# Patient Record
Sex: MALE
[De-identification: names, ages, dates, MRNs, and addresses within clinical notes are randomized per-mention and may not be internally consistent; named-entity substitution may affect disease eponyms.]

---

## 2018-12-27 ENCOUNTER — HOSPITAL ENCOUNTER (EMERGENCY)
Dept: HOSPITAL 97 - ER | Age: 28
Discharge: HOME | End: 2018-12-27
Payer: SELF-PAY

## 2018-12-27 DIAGNOSIS — M54.5: Primary | ICD-10-CM

## 2018-12-27 PROCEDURE — 96372 THER/PROPH/DIAG INJ SC/IM: CPT

## 2018-12-27 PROCEDURE — 99283 EMERGENCY DEPT VISIT LOW MDM: CPT

## 2018-12-27 NOTE — EDPHYS
Physician Documentation                                                                           

 [facility]                                                                

Name: Gagandeep Rehman Jr                                                                           

Age: 28 yrs                                                                                       

Sex: Male                                                                                         

: 1990                                                                                   

MRN: [id number]                                                                                   

Arrival Date: 2018                                                                          

Time: 20:21                                                                                       

Account#: [id number]                                                                            

Bed 16                                                                                            

Private MD: None, None                                                                            

ED Physician Vinayak Cook                                                                     

HPI:                                                                                              

                                                                                             

20:52 This 28 yrs old  Male presents to ER via Ambulatory with complaints of Low Back kb  

      Pain.                                                                                       

20:52 The patient presents with pain that is acute, with no known mechanism of injury, and    kb  

      tenderness. The symptoms are located in the right low back. The pain does not radiate.      

      The problem was sustained without known cause. Onset: The symptoms/episode                  

      began/occurred 17 day(s) ago. Modifying factors: the patient symptoms are aggravated by     

      movement, standing. Associated signs and symptoms: The patient has no apparent              

      associated signs or symptoms. Severity of symptoms: At their worst the symptoms were        

      moderate, in the emergency department the symptoms are unchanged. The patient has not       

      experienced similar symptoms in the past. The patient has not recently seen a               

      physician. Pt reports right low back pain that started on . Denies injury      

      or trauma, woke up with the pain. .                                                         

                                                                                                  

Historical:                                                                                       

- Allergies:                                                                                      

20:27 No Known Allergies;                                                                     aj1 

- Home Meds:                                                                                      

20:27 None [Active];                                                                          aj1 

- PMHx:                                                                                           

20:27 None;                                                                                   aj1 

- PSHx:                                                                                           

20:27 None;                                                                                   aj1 

                                                                                                  

- Immunization history:: Flu vaccine is not up to date.                                           

- Social history:: Smoking status: Patient/guardian denies using tobacco.                         

- Ebola Screening: : Patient denies travel to an Ebola-affected area in the 21 days               

  before illness onset.                                                                           

                                                                                                  

                                                                                                  

ROS:                                                                                              

20:51 Constitutional: Negative for fever, chills, and weight loss, ENT: Negative for injury,  kb  

      pain, and discharge, Neck: Negative for injury, pain, and swelling, Cardiovascular:         

      Negative for chest pain, palpitations, and edema, Respiratory: Negative for shortness       

      of breath, cough, wheezing, and pleuritic chest pain, Abdomen/GI: Negative for              

      abdominal pain, nausea, vomiting, diarrhea, and constipation, MS/Extremity: Negative        

      for injury and deformity, Skin: Negative for injury, rash, and discoloration, Neuro:        

      Negative for headache, weakness, numbness, tingling, and seizure.                           

20:51 Back: Positive for pain at rest, pain with movement, of the right low back.                 

                                                                                                  

Exam:                                                                                             

20:51 Constitutional:  This is a well developed, well nourished patient who is awake, alert,  kb  

      and in no acute distress. Head/Face:  Normocephalic, atraumatic. ENT:  Nares patent. No     

      nasal discharge, no septal abnormalities noted.  Tympanic membranes are normal and          

      external auditory canals are clear.  Oropharynx with no redness, swelling, or masses,       

      exudates, or evidence of obstruction, uvula midline.  Mucous membranes moist. Neck:         

      Trachea midline, no thyromegaly or masses palpated, and no cervical lymphadenopathy.        

      Supple, full range of motion without nuchal rigidity, or vertebral point tenderness.        

      No Meningismus. Chest/axilla:  Normal chest wall appearance and motion.  Nontender with     

      no deformity.  No lesions are appreciated. Cardiovascular:  Regular rate and rhythm         

      with a normal S1 and S2.  No gallops, murmurs, or rubs.  Normal PMI, no JVD.  No pulse      

      deficits. Respiratory:  Lungs have equal breath sounds bilaterally, clear to                

      auscultation and percussion.  No rales, rhonchi or wheezes noted.  No increased work of     

      breathing, no retractions or nasal flaring. Abdomen/GI:  Soft, non-tender, with normal      

      bowel sounds.  No distension or tympany.  No guarding or rebound.  No evidence of           

      tenderness throughout. Skin:  Warm, dry with normal turgor.  Normal color with no           

      rashes, no lesions, and no evidence of cellulitis. MS/ Extremity:  Pulses equal, no         

      cyanosis.  Neurovascular intact.  Full, normal range of motion. Neuro:  Awake and           

      alert, GCS 15, oriented to person, place, time, and situation.  Cranial nerves II-XII       

      grossly intact.  Motor strength 5/5 in all extremities.  Sensory grossly intact.            

      Cerebellar exam normal.  Normal gait.                                                       

20:51 Back: pain, that is moderate, that is severe, of the  right low back, ROM is painful,       

      normal spinal alignment noted.                                                              

                                                                                                  

Vital Signs:                                                                                      

20:27  / 106; Pulse 70; Resp 18; Temp 98.1; Pulse Ox 100% on R/A; Weight 108.86 kg (R); aj1 

      Height 5 ft. 10 in. (177.80 cm) (R); Pain 10/10;                                            

21:30  / 75; Pulse 71; Resp 16; Pulse Ox 99% ;                                          rr5 

20:27 Body Mass Index 34.44 (108.86 kg, 177.80 cm)                                            aj1 

                                                                                                  

MDM:                                                                                              

20:33 Patient medically screened.                                                             kb  

20:51 Data reviewed: vital signs, nurses notes. Data interpreted: Pulse oximetry: on room air kb  

      is 100 %. Interpretation: normal. Counseling: I had a detailed discussion with the          

      patient and/or guardian regarding: the historical points, exam findings, and any            

      diagnostic results supporting the discharge/admit diagnosis, the need for outpatient        

      follow up, a family practitioner, to return to the emergency department if symptoms         

      worsen or persist or if there are any questions or concerns that arise at home.             

                                                                                                  

Administered Medications:                                                                         

21:00 Drug: Norco 10 mg-325 mg 1 tabs Route: PO;                                              rr5 

21:56 Follow up: Response: No adverse reaction                                                rr5 

21:04 Drug: Decadron 10 mg Route: IM; Site: right gluteus;                                    rr5 

21:56 Follow up: Response: No adverse reaction                                                rr5 

                                                                                                  

                                                                                                  

Disposition:                                                                                      

18 21:24 Discharged to Home. Impression: Low back pain.                                     

- Condition is Stable.                                                                            

- Discharge Instructions: Back Pain, Adult, Easy-to-Read, Back Exercises, Easy-to-Read.           

- Prescriptions for Cyclobenzaprine 10 mg Oral Tablet - take 1 tablet by ORAL route               

  every 8 hours As needed; 21 tablet. Tramadol 50 mg Oral Tablet - take 1 tablet by               

  ORAL route every 8 hours as needed; 15 tablet.                                                  

- Medication Reconciliation Form, Thank You Letter, Antibiotic Education, Prescription            

  Opioid Use, Work release form form.                                                             

- Follow up: Emergency Department; When: As needed; Reason: Worsening of condition.               

  Follow up: Private Physician; When: 2 - 3 days; Reason: Recheck today's complaints,             

  Continuance of care, Re-evaluation by your physician.                                           

                                                                                                  

                                                                                                  

                                                                                                  

Addendum:                                                                                         

2019                                                                                        

     06:53 Co-signature as Attending Physician, Vinayak Cook MD I agree with the assessment and t
w4

           plan of care.                                                                          

                                                                                                  

Signatures:                                                                                       

Dina Velazquez, KRAIGP-C                 KRAIGP-Ckb                                                   

Dejha Baron RN                     RN   aj1                                                  

Vinayak Cook MD MD   tw4                                                  

Jony Rao RN                      RN   rr5                                                  

                                                                                                  

Corrections: (The following items were deleted from the chart)                                    

                                                                                             

21:56 21:24 2018 21:24 Discharged to Home. Impression: Low back pain. Condition is      rr5 

      Stable. Discharge Instructions: Back Pain, Adult, Easy-to-Read, Back Exercises,             

      Easy-to-Read. Prescriptions for Cyclobenzaprine 10 mg Oral Tablet - take 1 tablet by        

      ORAL route every 8 hours As needed; 21 tablet, Tramadol 50 mg Oral Tablet - take 1          

      tablet by ORAL route every 8 hours as needed; 15 tablet. and Forms are Medication           

      Reconciliation Form, Thank You Letter, Antibiotic Education, Prescription Opioid Use.       

      Follow up: Emergency Department; When: As needed; Reason: Worsening of condition.           

      Follow up: Private Physician; When: 2 - 3 days; Reason: Recheck today's complaints,         

      Continuance of care, Re-evaluation by your physician. kb                                    

                                                                                                  

**************************************************************************************************

## 2018-12-27 NOTE — ER
Nurse's Notes                                                                                     

 [facility]                                                                

Name: Gagandeep Rehman Jr                                                                           

Age: 28 yrs                                                                                       

Sex: Male                                                                                         

: 1990                                                                                   

MRN: [id number]                                                                                   

Arrival Date: 2018                                                                          

Time: 20:21                                                                                       

Account#: [id number]                                                                            

Bed 16                                                                                            

Private MD: None, None                                                                            

Diagnosis: Low back pain                                                                          

                                                                                                  

Presentation:                                                                                     

                                                                                             

20:25 Presenting complaint: Patient states: "I'm having severe low back pain" Reports pain    aj1 

      for the past 17 days. Denies injury to his back [facility]     

      this complaint. Transition of care: patient was not received from another setting of        

      care. Onset of symptoms was December 10, 2018. Risk Assessment: Do you want to hurt         

      yourself or someone else? Patient reports no desire to harm self or others. Initial         

      Sepsis Screen: Does the patient meet any 2 criteria? No. Patient's initial sepsis           

      screen is negative. Does the patient have a suspected source of infection? No.              

      Patient's initial sepsis screen is negative. Care prior to arrival: None.                   

20:25 Method Of Arrival: Ambulatory                                                           aj1 

20:25 Acuity: ESE 4                                                                           aj1 

                                                                                                  

Triage Assessment:                                                                                

20:27 General: Appears in no apparent distress. uncomfortable, Behavior is calm, cooperative, aj1 

      appropriate for age. Pain: Complains of pain in low back area Pain currently is 10 out      

      of 10 on a pain scale. Neuro: Level of Consciousness is awake, alert, obeys commands.       

      Cardiovascular: Patient's skin is warm and dry. Respiratory: Airway is patent               

      Respiratory effort is even, unlabored, Respiratory pattern is regular, symmetrical.         

                                                                                                  

Historical:                                                                                       

- Allergies:                                                                                      

20:27 No Known Allergies;                                                                     aj1 

- Home Meds:                                                                                      

20:27 None [Active];                                                                          aj1 

- PMHx:                                                                                           

20:27 None;                                                                                   aj1 

- PSHx:                                                                                           

20:27 None;                                                                                   aj1 

                                                                                                  

- Immunization history:: Flu vaccine is not up to date.                                           

- Social history:: Smoking status: Patient/guardian denies using tobacco.                         

- Ebola Screening: : Patient denies travel to an Ebola-affected area in the 21 days               

  before illness onset.                                                                           

                                                                                                  

                                                                                                  

Screenin:00 Abuse screen: Denies threats or abuse. Denies injuries from another.                    rr5 

21:00 Nutritional screening: No deficits noted. Tuberculosis screening: No symptoms or risk   rr5 

      factors identified. Fall Risk None identified.                                              

                                                                                                  

Assessment:                                                                                       

21:05 General: Appears in no apparent distress. uncomfortable, Behavior is calm, cooperative, rr5 

      appropriate for age. Pain: Complains of pain in low back Pain does not radiate. Pain        

      currently is 10 out of 10 on a pain scale. Quality of pain is described as aching, Pain     

      began gradually, Is intermittent. Neuro: Level of Consciousness is awake, alert, obeys      

      commands, Oriented to person, place, time, situation. Cardiovascular: Capillary refill      

      < 3 seconds Patient's skin is warm and dry. Respiratory: Airway is patent Respiratory       

      effort is even, unlabored, Respiratory pattern is regular, symmetrical. GI: No signs        

      and/or symptoms were reported involving the gastrointestinal system. : No signs           

      and/or symptoms were reported regarding the genitourinary system. EENT: No signs and/or     

      symptoms were reported regarding the EENT system. Derm: Skin is intact, Skin                

      temperature is warm. Musculoskeletal: Capillary refill < 3 seconds, Reports pain in low     

      back.                                                                                       

21:50 Reassessment: Patient appears in no apparent distress at this time. discharge           rr5 

      instruction given and explained with no complaints made. Patient states feeling better.     

      Patient states symptoms have improved.                                                      

                                                                                                  

Vital Signs:                                                                                      

20:27  / 106; Pulse 70; Resp 18; Temp 98.1; Pulse Ox 100% on R/A; Weight 108.86 kg (R); aj1 

      Height 5 ft. 10 in. (177.80 cm) (R); Pain 10/10;                                            

21:30  / 75; Pulse 71; Resp 16; Pulse Ox 99% ;                                          rr5 

20:27 Body Mass Index 34.44 (108.86 kg, 177.80 cm)                                            aj1 

                                                                                                  

ED Course:                                                                                        

20:21 Patient arrived in ED.                                                                  es  

20:21 None, None is Private Physician.                                                        es  

20:27 Triage completed.                                                                       aj1 

20:27 Arm band placed on Patient placed in an exam room.                                      aj1 

20:32 Dina Velazquez [facility]C is [facility]                                                        kb  

20:32 Vinayak Cook MD is Attending Physician.                                            kb  

20:37 Jony Rao RN is Primary Nurse.                                                    rr5 

21:00 Patient has correct armband on for positive identification. Bed in low position. Call   rr5 

      light in reach.                                                                             

21:55 No provider procedures requiring assistance completed. IV discontinued.                 rr5 

                                                                                                  

Administered Medications:                                                                         

21:00 Drug: Norco 10 mg-325 mg 1 tabs Route: PO;                                              rr5 

21:56 Follow up: Response: No adverse reaction                                                rr5 

21:04 Drug: Decadron 10 mg Route: IM; Site: right gluteus;                                    rr5 

21:56 Follow up: Response: No adverse reaction                                                rr5 

                                                                                                  

                                                                                                  

Outcome:                                                                                          

21:24 Discharge ordered by MD. graves  

21:55 Discharged to home ambulatory, with family, with friend.                                rr5 

21:55 Condition: stable                                                                           

21:55 Discharge instructions given to patient, family, Instructed on discharge instructions,      

      follow up and referral plans. medication usage, Demonstrated understanding of               

      instructions, follow-up care, medications, Prescriptions given X 2.                         

21:56 Patient left the ED.                                                                    rr5 

                                                                                                  

Signatures:                                                                                       

Dina Velazquez, LUDA LAYNE-Dejah Villatoro RN                     RN   aj1                                                  

Paula Beasley Raymond RN                      RN   rr5                                                  

                                                                                                  

**************************************************************************************************